# Patient Record
Sex: MALE | Race: WHITE | Employment: FULL TIME | URBAN - METROPOLITAN AREA
[De-identification: names, ages, dates, MRNs, and addresses within clinical notes are randomized per-mention and may not be internally consistent; named-entity substitution may affect disease eponyms.]

---

## 2017-11-20 ENCOUNTER — HOSPITAL ENCOUNTER (OUTPATIENT)
Dept: CT IMAGING | Age: 52
Discharge: HOME | End: 2017-11-20
Payer: COMMERCIAL

## 2017-11-20 DIAGNOSIS — N20.0 RENAL STONE: ICD-10-CM

## 2017-11-20 PROCEDURE — 74176 CT ABD & PELVIS W/O CONTRAST: CPT

## 2018-09-17 ENCOUNTER — HOSPITAL ENCOUNTER (EMERGENCY)
Age: 53
Discharge: HOME | End: 2018-09-17
Attending: STUDENT IN AN ORGANIZED HEALTH CARE EDUCATION/TRAINING PROGRAM
Payer: COMMERCIAL

## 2018-09-17 ENCOUNTER — APPOINTMENT (OUTPATIENT)
Dept: CT IMAGING | Age: 53
End: 2018-09-17
Payer: COMMERCIAL

## 2018-09-17 VITALS
RESPIRATION RATE: 18 BRPM | BODY MASS INDEX: 21.4 KG/M2 | DIASTOLIC BLOOD PRESSURE: 72 MMHG | HEIGHT: 72 IN | TEMPERATURE: 97.5 F | OXYGEN SATURATION: 97 % | WEIGHT: 158 LBS | HEART RATE: 77 BPM | SYSTOLIC BLOOD PRESSURE: 114 MMHG

## 2018-09-17 DIAGNOSIS — R10.12 LEFT UPPER QUADRANT PAIN: Primary | ICD-10-CM

## 2018-09-17 LAB
ALBUMIN SERPL-MCNC: 4.3 G/DL (ref 3.9–4.9)
ALP BLD-CCNC: 65 U/L (ref 35–104)
ALT SERPL-CCNC: 19 U/L (ref 0–41)
AMPHETAMINE SCREEN, URINE: ABNORMAL
ANION GAP SERPL CALCULATED.3IONS-SCNC: 10 MEQ/L (ref 7–13)
AST SERPL-CCNC: 22 U/L (ref 0–40)
BACTERIA: NORMAL /HPF
BARBITURATE SCREEN URINE: ABNORMAL
BASOPHILS ABSOLUTE: 0.1 K/UL (ref 0–0.2)
BASOPHILS RELATIVE PERCENT: 0.8 %
BENZODIAZEPINE SCREEN, URINE: ABNORMAL
BILIRUB SERPL-MCNC: <0.2 MG/DL (ref 0–1.2)
BILIRUBIN URINE: NEGATIVE
BLOOD, URINE: ABNORMAL
BUN BLDV-MCNC: 13 MG/DL (ref 6–20)
CALCIUM SERPL-MCNC: 9 MG/DL (ref 8.6–10.2)
CANNABINOID SCREEN URINE: POSITIVE
CHLORIDE BLD-SCNC: 108 MEQ/L (ref 98–107)
CLARITY: CLEAR
CO2: 23 MEQ/L (ref 22–29)
COCAINE METABOLITE SCREEN URINE: ABNORMAL
COLOR: YELLOW
CREAT SERPL-MCNC: 1.19 MG/DL (ref 0.7–1.2)
EOSINOPHILS ABSOLUTE: 0.1 K/UL (ref 0–0.7)
EOSINOPHILS RELATIVE PERCENT: 1.6 %
ETHANOL PERCENT: NORMAL G/DL
ETHANOL: <10 MG/DL (ref 0–0.08)
GFR AFRICAN AMERICAN: >60
GFR NON-AFRICAN AMERICAN: >60
GLOBULIN: 2.6 G/DL (ref 2.3–3.5)
GLUCOSE BLD-MCNC: 85 MG/DL (ref 74–109)
GLUCOSE URINE: NEGATIVE MG/DL
HCT VFR BLD CALC: 43.6 % (ref 42–52)
HEMOGLOBIN: 14.9 G/DL (ref 14–18)
KETONES, URINE: NEGATIVE MG/DL
LEUKOCYTE ESTERASE, URINE: NEGATIVE
LIPASE: 35 U/L (ref 13–60)
LYMPHOCYTES ABSOLUTE: 1.7 K/UL (ref 1–4.8)
LYMPHOCYTES RELATIVE PERCENT: 20.4 %
Lab: ABNORMAL
MCH RBC QN AUTO: 33.3 PG (ref 27–31.3)
MCHC RBC AUTO-ENTMCNC: 34.1 % (ref 33–37)
MCV RBC AUTO: 97.5 FL (ref 80–100)
MONOCYTES ABSOLUTE: 0.5 K/UL (ref 0.2–0.8)
MONOCYTES RELATIVE PERCENT: 5.6 %
NEUTROPHILS ABSOLUTE: 5.9 K/UL (ref 1.4–6.5)
NEUTROPHILS RELATIVE PERCENT: 71.6 %
NITRITE, URINE: NEGATIVE
OPIATE SCREEN URINE: ABNORMAL
PDW BLD-RTO: 13.5 % (ref 11.5–14.5)
PH UA: 6.5 (ref 5–9)
PHENCYCLIDINE SCREEN URINE: ABNORMAL
PLATELET # BLD: 130 K/UL (ref 130–400)
POTASSIUM SERPL-SCNC: 4.8 MEQ/L (ref 3.5–5.1)
PROTEIN UA: NEGATIVE MG/DL
RBC # BLD: 4.48 M/UL (ref 4.7–6.1)
RBC UA: NORMAL /HPF (ref 0–2)
SODIUM BLD-SCNC: 141 MEQ/L (ref 132–144)
SPECIFIC GRAVITY UA: 1.01 (ref 1–1.03)
TOTAL PROTEIN: 6.9 G/DL (ref 6.4–8.1)
URINE REFLEX TO CULTURE: YES
UROBILINOGEN, URINE: 0.2 E.U./DL
WBC # BLD: 8.2 K/UL (ref 4.8–10.8)
WBC UA: NORMAL /HPF (ref 0–5)

## 2018-09-17 PROCEDURE — 80053 COMPREHEN METABOLIC PANEL: CPT

## 2018-09-17 PROCEDURE — G0480 DRUG TEST DEF 1-7 CLASSES: HCPCS

## 2018-09-17 PROCEDURE — 99284 EMERGENCY DEPT VISIT MOD MDM: CPT

## 2018-09-17 PROCEDURE — 2580000003 HC RX 258: Performed by: PHYSICIAN ASSISTANT

## 2018-09-17 PROCEDURE — 81001 URINALYSIS AUTO W/SCOPE: CPT

## 2018-09-17 PROCEDURE — 87086 URINE CULTURE/COLONY COUNT: CPT

## 2018-09-17 PROCEDURE — 74176 CT ABD & PELVIS W/O CONTRAST: CPT

## 2018-09-17 PROCEDURE — 83690 ASSAY OF LIPASE: CPT

## 2018-09-17 PROCEDURE — 36415 COLL VENOUS BLD VENIPUNCTURE: CPT

## 2018-09-17 PROCEDURE — 96374 THER/PROPH/DIAG INJ IV PUSH: CPT

## 2018-09-17 PROCEDURE — 96375 TX/PRO/DX INJ NEW DRUG ADDON: CPT

## 2018-09-17 PROCEDURE — 85025 COMPLETE CBC W/AUTO DIFF WBC: CPT

## 2018-09-17 PROCEDURE — 80307 DRUG TEST PRSMV CHEM ANLYZR: CPT

## 2018-09-17 PROCEDURE — 6360000002 HC RX W HCPCS: Performed by: PHYSICIAN ASSISTANT

## 2018-09-17 RX ORDER — 0.9 % SODIUM CHLORIDE 0.9 %
1000 INTRAVENOUS SOLUTION INTRAVENOUS ONCE
Status: COMPLETED | OUTPATIENT
Start: 2018-09-17 | End: 2018-09-17

## 2018-09-17 RX ORDER — KETOROLAC TROMETHAMINE 30 MG/ML
30 INJECTION, SOLUTION INTRAMUSCULAR; INTRAVENOUS ONCE
Status: COMPLETED | OUTPATIENT
Start: 2018-09-17 | End: 2018-09-17

## 2018-09-17 RX ORDER — ONDANSETRON 2 MG/ML
4 INJECTION INTRAMUSCULAR; INTRAVENOUS ONCE
Status: COMPLETED | OUTPATIENT
Start: 2018-09-17 | End: 2018-09-17

## 2018-09-17 RX ADMIN — ONDANSETRON HYDROCHLORIDE 4 MG: 2 INJECTION, SOLUTION INTRAMUSCULAR; INTRAVENOUS at 12:37

## 2018-09-17 RX ADMIN — SODIUM CHLORIDE 1000 ML: 9 INJECTION, SOLUTION INTRAVENOUS at 12:37

## 2018-09-17 RX ADMIN — KETOROLAC TROMETHAMINE 30 MG: 30 INJECTION, SOLUTION INTRAMUSCULAR at 12:37

## 2018-09-17 ASSESSMENT — PAIN DESCRIPTION - FREQUENCY: FREQUENCY: INTERMITTENT

## 2018-09-17 ASSESSMENT — ENCOUNTER SYMPTOMS
SHORTNESS OF BREATH: 0
SORE THROAT: 0
COUGH: 0
CONSTIPATION: 0
COLOR CHANGE: 0
NAUSEA: 1
BACK PAIN: 0
RHINORRHEA: 0
VOMITING: 0
CHOKING: 0
ABDOMINAL PAIN: 1
ABDOMINAL DISTENTION: 0
EYE DISCHARGE: 0

## 2018-09-17 ASSESSMENT — PAIN DESCRIPTION - ORIENTATION
ORIENTATION: LEFT
ORIENTATION: LEFT;UPPER

## 2018-09-17 ASSESSMENT — PAIN DESCRIPTION - PAIN TYPE
TYPE: ACUTE PAIN
TYPE: ACUTE PAIN

## 2018-09-17 ASSESSMENT — PAIN SCALES - GENERAL
PAINLEVEL_OUTOF10: 1
PAINLEVEL_OUTOF10: 0
PAINLEVEL_OUTOF10: 7

## 2018-09-17 ASSESSMENT — PAIN DESCRIPTION - DESCRIPTORS: DESCRIPTORS: BURNING;THROBBING

## 2018-09-17 ASSESSMENT — PAIN DESCRIPTION - LOCATION
LOCATION: ABDOMEN
LOCATION: ABDOMEN

## 2018-09-17 NOTE — ED PROVIDER NOTES
headaches. Psychiatric/Behavioral: Negative for agitation and confusion. Except as noted above the remainder of the review of systems was reviewed and negative. PAST MEDICAL HISTORY     Past Medical History:   Diagnosis Date    Chronic back pain     Chronic kidney disease     Stroke ([facility] [address] 2013         SURGICAL HISTORY     History reviewed. No pertinent surgical history. CURRENT MEDICATIONS       Previous Medications    ASPIRIN 81 MG TABLET    Take 81 mg by mouth daily    MELOXICAM (MOBIC) 15 MG TABLET    Take 1 tablet by mouth daily    MELOXICAM (MOBIC) 15 MG TABLET    Take 1 tablet by mouth daily       ALLERGIES     Patient has no known allergies. FAMILY HISTORY       Family History   Problem Relation Age of Onset    Stroke Father     Heart Disease Father           SOCIAL HISTORY       Social History     Social History    Marital status: Single     Spouse name: N/A    Number of children: N/A    Years of education: N/A     Social History Main Topics    Smoking status: Current Some Day Smoker     Types: Cigars    Smokeless tobacco: Never Used    Alcohol use 0.0 oz/week    Drug use: No    Sexual activity: Not Asked     Other Topics Concern    None     Social History Narrative    None       SCREENINGS             PHYSICAL EXAM    (up to 7 for level 4, 8 or more for level 5)     ED Triage Vitals [09/17/18 1054]   BP Temp Temp Source Pulse Resp SpO2 Height Weight   114/72 97.5 °F (36.4 °C) Oral 77 18 97 % 6' (1.829 m) 158 lb (71.7 kg)       Physical Exam   Constitutional: He is oriented to person, place, and time. He appears well-developed and well-nourished. HENT:   Head: Normocephalic. Eyes: Pupils are equal, round, and reactive to light. Neck: Normal range of motion. Neck supple. No JVD present. No tracheal deviation present. Cardiovascular: Normal rate. Pulmonary/Chest: Effort normal. No respiratory distress. He has no wheezes. He has no rales.  He exhibits no 6-12 months, then consider CT at 18-24 months                    >8 mm     Consider CT at 3 months, PET/CT, or tissue sampling   Nodules <6 mm do not require routine follow-up, but certain patients at high risk with suspicious nodule morphology, upper lobe location, or both may warrant 12-month follow-up (recommendation 1A). High risk**                   <6 mm     Optional CT at 12 months                  6-8 mm     CT at 6-12 months, then CT at 18-24 months                    >8 mm     Consider CT at 3 months, PET/CT, or tissue sampling   Nodules <6 mm do not require routine follow-up, but certain patients at high risk with suspicious nodule morphology, upper lobe location, or both may warrant 12-month follow-up (recommendation 1A). Multiple                              Low risk**                   <6 mm     No routine follow-up                  6-8 mm     CT at 3-6 months, then consider CT at 18-24 months                   >8 mm     CT at 3-6 months, then  consider CT at 18-24 months   Use most suspicious nodule as guide to management. Follow-up intervals may vary according to size and risk (recommendation 2A). High risk**                   <6 mm     Optional CT at 12 months                  6-8 mm     CT at 3-6 months, then at 18-24 months                  >8 mm     CT at 3-6 months, then at 18-24 months   Use most suspicious nodule as guide to management. Follow-up intervals may vary according to size and risk (recommendation 2A). B: Subsolid Nodules*           Single                        Ground glass                   <6 mm     No routine follow-up                 >=6 mm     CT at 6-12 months to confirm persistence, then CT  every 2 years until 5 years   In certain suspicious nodules <6 mm, consider follow-up at 2 and 4 years. If solid component(s) or growth develops, consider resection. (Recommendations 3A and 4A).                 Part solid

## 2018-09-19 LAB — URINE CULTURE, ROUTINE: NORMAL

## 2023-05-15 ENCOUNTER — INITIAL CONSULT (OUTPATIENT)
Dept: PODIATRY | Age: 58
End: 2023-05-15

## 2023-05-15 VITALS — BODY MASS INDEX: 20.32 KG/M2 | WEIGHT: 150 LBS | TEMPERATURE: 98 F | HEIGHT: 72 IN

## 2023-05-15 DIAGNOSIS — M25.571 CHRONIC PAIN OF RIGHT ANKLE: Primary | ICD-10-CM

## 2023-05-15 DIAGNOSIS — S93.411A SPRAIN OF CALCANEOFIBULAR LIGAMENT OF RIGHT ANKLE, INITIAL ENCOUNTER: ICD-10-CM

## 2023-05-15 DIAGNOSIS — G89.29 CHRONIC PAIN OF RIGHT ANKLE: Primary | ICD-10-CM

## 2023-05-15 DIAGNOSIS — S93.491A SPRAIN OF ANTERIOR TALOFIBULAR LIGAMENT OF RIGHT ANKLE, INITIAL ENCOUNTER: ICD-10-CM

## 2023-05-15 PROCEDURE — 99203 OFFICE O/P NEW LOW 30 MIN: CPT | Performed by: PODIATRIST

## 2023-05-15 RX ORDER — ATORVASTATIN CALCIUM 40 MG/1
40 TABLET, FILM COATED ORAL
COMMUNITY
Start: 2023-03-23

## 2023-05-15 RX ORDER — NAPROXEN 500 MG/1
500 TABLET ORAL 2 TIMES DAILY
COMMUNITY
Start: 2023-04-26

## 2023-05-15 ASSESSMENT — ENCOUNTER SYMPTOMS
VOMITING: 0
SHORTNESS OF BREATH: 0
NAUSEA: 0
BACK PAIN: 0

## 2023-05-15 NOTE — PROGRESS NOTES
There is no tenderness palpation of the base of the fifth metatarsal.  There is tenderness palpation of the anterior talofibular ligament as well as the calcaneofibular ligament at the lateral right ankle. Right ankle anterior drawer is negative, talar tilt is negative (the left is actually worse than the right), external rotation is negative, and distal squeeze exam is negative. Lymphadenopathy:      Comments: Popliteal lymph nodes are soft and nontender. Skin:     Capillary Refill: Capillary refill takes less than 2 seconds. Comments: No open lesions noted bilaterally. Normal skin turgor noted bilaterally. Normal skin texture noted bilaterally. Focal hyperkeratotic lesion noted to the left hallux IPJ and the left first MPJ  Nail plates are well groomed bilaterally. Neurological:      Mental Status: He is alert and oriented to person, place, and time. Deep Tendon Reflexes: Babinski sign absent on the right side. Babinski sign absent on the left side. Reflex Scores:       Patellar reflexes are 2+ on the right side and 2+ on the left side. Achilles reflexes are 2+ on the right side and 2+ on the left side. Comments: No ankle clonus noted bilaterally. Altered light touch sensation noted to the left foot. Psychiatric:         Mood and Affect: Mood normal.         Behavior: Behavior normal.       Assessment:      Diagnosis Orders   1. Chronic pain of right ankle  XR ANKLE RIGHT (MIN 3 VIEWS)      2. Sprain of anterior talofibular ligament of right ankle, initial encounter  XR ANKLE RIGHT (MIN 3 VIEWS)      3. Sprain of calcaneofibular ligament of right ankle, initial encounter  XR ANKLE RIGHT (MIN 3 VIEWS)          Plan:     Chronic right ankle pain/lateral collateral ligament rupture: We discussed the etiology and treatment options in detail. I recommend the patient continue use of the ASO style ankle brace in his tall work boot when standing and walking.   Patient should

## 2023-05-30 ENCOUNTER — OFFICE VISIT (OUTPATIENT)
Dept: PODIATRY | Age: 58
End: 2023-05-30
Payer: COMMERCIAL

## 2023-05-30 ENCOUNTER — TELEPHONE (OUTPATIENT)
Dept: PODIATRY | Age: 58
End: 2023-05-30

## 2023-05-30 VITALS — HEIGHT: 72 IN | BODY MASS INDEX: 20.32 KG/M2 | TEMPERATURE: 97.1 F | WEIGHT: 150 LBS

## 2023-05-30 DIAGNOSIS — S93.411A SPRAIN OF CALCANEOFIBULAR LIGAMENT OF RIGHT ANKLE, INITIAL ENCOUNTER: ICD-10-CM

## 2023-05-30 DIAGNOSIS — G89.29 CHRONIC PAIN OF RIGHT ANKLE: Primary | ICD-10-CM

## 2023-05-30 DIAGNOSIS — M25.571 CHRONIC PAIN OF RIGHT ANKLE: Primary | ICD-10-CM

## 2023-05-30 DIAGNOSIS — M65.871 OTHER SYNOVITIS AND TENOSYNOVITIS, RIGHT ANKLE AND FOOT: ICD-10-CM

## 2023-05-30 DIAGNOSIS — M19.171 POST-TRAUMATIC ARTHRITIS OF RIGHT ANKLE: ICD-10-CM

## 2023-05-30 DIAGNOSIS — S93.491A SPRAIN OF ANTERIOR TALOFIBULAR LIGAMENT OF RIGHT ANKLE, INITIAL ENCOUNTER: ICD-10-CM

## 2023-05-30 PROCEDURE — 3017F COLORECTAL CA SCREEN DOC REV: CPT | Performed by: PODIATRIST

## 2023-05-30 PROCEDURE — 4004F PT TOBACCO SCREEN RCVD TLK: CPT | Performed by: PODIATRIST

## 2023-05-30 PROCEDURE — G8420 CALC BMI NORM PARAMETERS: HCPCS | Performed by: PODIATRIST

## 2023-05-30 PROCEDURE — 99213 OFFICE O/P EST LOW 20 MIN: CPT | Performed by: PODIATRIST

## 2023-05-30 PROCEDURE — G8427 DOCREV CUR MEDS BY ELIG CLIN: HCPCS | Performed by: PODIATRIST

## 2023-05-30 ASSESSMENT — ENCOUNTER SYMPTOMS
NAUSEA: 0
BACK PAIN: 0
VOMITING: 0
SHORTNESS OF BREATH: 0

## 2023-05-30 NOTE — PROGRESS NOTES
[address] [facility]  Dept: [phone]  Loc: [phone]       Osman Mayfield  ([birth year])    5/30/23    Subjective     Osman Mayfield is 62 y.o. male who complains today of:    Chief Complaint   Patient presents with    Foot Pain     Right       Foot Pain   Pertinent negatives include no fever or numbness. HPI: Patient presents for follow-up. Patient denies significant improvement with use of the ASO style ankle brace. Patient continues to have achiness and throbbing at rest, sharp pain with activity. Patient reports he did have the previously ordered radiographs performed. Review of Systems   Constitutional:  Negative for chills and fever. HENT:  Negative for hearing loss. Respiratory:  Negative for shortness of breath. Cardiovascular:  Negative for chest pain. Gastrointestinal:  Negative for nausea and vomiting. Genitourinary:  Negative for difficulty urinating. Musculoskeletal:  Positive for arthralgias and gait problem. Negative for back pain. Skin:  Negative for wound. Neurological:  Negative for numbness. Hematological:  Does not bruise/bleed easily. Psychiatric/Behavioral:  Negative for sleep disturbance. Allergies:  Patient has no known allergies. Current Outpatient Medications on File Prior to Visit   Medication Sig Dispense Refill    atorvastatin (LIPITOR) 40 MG tablet Take 1 tablet by mouth      naproxen (NAPROSYN) 500 MG tablet Take 1 tablet by mouth 2 times daily      aspirin 81 MG tablet Take 1 tablet by mouth daily       No current facility-administered medications on file prior to visit. Past Medical History:   Diagnosis Date    Chronic back pain     Chronic kidney disease     Stroke [facility]) 2013     History reviewed. No pertinent surgical history.   Social History     Socioeconomic History    Marital status: Single

## 2023-06-02 ENCOUNTER — TELEPHONE (OUTPATIENT)
Dept: PODIATRY | Age: 58
End: 2023-06-02

## 2023-06-02 NOTE — TELEPHONE ENCOUNTER
ANKLE INJECTION    NO AUTH REQUIRED    OK to schedule procedure approved as above. Please note sides/levels approved and date range.    (If applicable, sides/levels approved may differ from those ordered)    TO Butch Ye

## 2023-07-11 ENCOUNTER — OFFICE VISIT (OUTPATIENT)
Dept: PODIATRY | Age: 58
End: 2023-07-11
Payer: COMMERCIAL

## 2023-07-11 VITALS — HEIGHT: 72 IN | WEIGHT: 145 LBS | BODY MASS INDEX: 19.64 KG/M2 | TEMPERATURE: 97.1 F

## 2023-07-11 DIAGNOSIS — S93.491D SPRAIN OF ANTERIOR TALOFIBULAR LIGAMENT OF RIGHT ANKLE, SUBSEQUENT ENCOUNTER: ICD-10-CM

## 2023-07-11 DIAGNOSIS — M25.571 CHRONIC PAIN OF RIGHT ANKLE: Primary | ICD-10-CM

## 2023-07-11 DIAGNOSIS — M19.171 POST-TRAUMATIC ARTHRITIS OF RIGHT ANKLE: ICD-10-CM

## 2023-07-11 DIAGNOSIS — M65.871 OTHER SYNOVITIS AND TENOSYNOVITIS, RIGHT ANKLE AND FOOT: ICD-10-CM

## 2023-07-11 DIAGNOSIS — S93.411D SPRAIN OF CALCANEOFIBULAR LIGAMENT OF RIGHT ANKLE, SUBSEQUENT ENCOUNTER: ICD-10-CM

## 2023-07-11 DIAGNOSIS — G89.29 CHRONIC PAIN OF RIGHT ANKLE: Primary | ICD-10-CM

## 2023-07-11 PROCEDURE — G8427 DOCREV CUR MEDS BY ELIG CLIN: HCPCS | Performed by: PODIATRIST

## 2023-07-11 PROCEDURE — 3017F COLORECTAL CA SCREEN DOC REV: CPT | Performed by: PODIATRIST

## 2023-07-11 PROCEDURE — G8420 CALC BMI NORM PARAMETERS: HCPCS | Performed by: PODIATRIST

## 2023-07-11 PROCEDURE — 4004F PT TOBACCO SCREEN RCVD TLK: CPT | Performed by: PODIATRIST

## 2023-07-11 PROCEDURE — 99213 OFFICE O/P EST LOW 20 MIN: CPT | Performed by: PODIATRIST

## 2023-07-11 ASSESSMENT — ENCOUNTER SYMPTOMS
VOMITING: 0
BACK PAIN: 1
NAUSEA: 0
SHORTNESS OF BREATH: 1

## 2023-07-11 NOTE — PROGRESS NOTES
[address]  [address]  Dept: [phone]  Loc: [phone]       Caesar White  ([birth year])    7/11/23    Subjective     Caesar White is 62 y.o. male who complains today of:    Chief Complaint   Patient presents with    Ankle Pain     Right       Ankle Pain   Associated symptoms include numbness. HPI: Patient presents for follow-up for chronic right ankle pain. Patient reports he had significant relief for about a week after the injection was performed at his last visit, he does report residual decrease in symptoms that has been persistent since that time. Patient reports he has been using the lace up/ASO style ankle brace, he still is yet to make an appointment with the  to be fitted for a gauntlet style AFO. Patient states he was unable to make an appointment due to a death in the family. Patient reports he has had decreased swelling as compared to prior to having the injection performed. Review of Systems   Constitutional:  Negative for chills and fever. HENT:  Negative for hearing loss. Respiratory:  Positive for shortness of breath. Cardiovascular:  Negative for chest pain. Gastrointestinal:  Negative for nausea and vomiting. Genitourinary:  Negative for difficulty urinating. Musculoskeletal:  Positive for arthralgias and back pain. Negative for gait problem. Skin:  Negative for wound. Neurological:  Positive for numbness. Hematological:  Does not bruise/bleed easily. Psychiatric/Behavioral:  Negative for sleep disturbance. Allergies:  Patient has no known allergies.     Current Outpatient Medications on File Prior to Visit   Medication Sig Dispense Refill    atorvastatin (LIPITOR) 40 MG tablet Take 1 tablet by mouth      naproxen (NAPROSYN) 500 MG tablet Take 1 tablet by mouth 2 times daily      aspirin 81 MG tablet Take 1 tablet

## 2023-09-11 ENCOUNTER — HOSPITAL ENCOUNTER (EMERGENCY)
Age: 58
Discharge: HOME | End: 2023-09-11
Payer: COMMERCIAL

## 2023-09-11 ENCOUNTER — APPOINTMENT (OUTPATIENT)
Dept: GENERAL RADIOLOGY | Age: 58
End: 2023-09-11
Payer: COMMERCIAL

## 2023-09-11 VITALS
WEIGHT: 148 LBS | HEIGHT: 72 IN | TEMPERATURE: 98.4 F | DIASTOLIC BLOOD PRESSURE: 73 MMHG | OXYGEN SATURATION: 97 % | RESPIRATION RATE: 16 BRPM | HEART RATE: 72 BPM | BODY MASS INDEX: 20.05 KG/M2 | SYSTOLIC BLOOD PRESSURE: 122 MMHG

## 2023-09-11 DIAGNOSIS — S91.311A LACERATION OF RIGHT FOOT, INITIAL ENCOUNTER: Primary | ICD-10-CM

## 2023-09-11 PROCEDURE — 6370000000 HC RX 637 (ALT 250 FOR IP)

## 2023-09-11 PROCEDURE — 12001 RPR S/N/AX/GEN/TRNK 2.5CM/<: CPT

## 2023-09-11 PROCEDURE — 73630 X-RAY EXAM OF FOOT: CPT

## 2023-09-11 PROCEDURE — 99283 EMERGENCY DEPT VISIT LOW MDM: CPT

## 2023-09-11 RX ORDER — CEPHALEXIN 500 MG/1
500 CAPSULE ORAL 4 TIMES DAILY
Qty: 28 CAPSULE | Refills: 0 | Status: SHIPPED | OUTPATIENT
Start: 2023-09-11 | End: 2023-09-18

## 2023-09-11 RX ORDER — CEPHALEXIN 500 MG/1
500 CAPSULE ORAL ONCE
Status: COMPLETED | OUTPATIENT
Start: 2023-09-11 | End: 2023-09-11

## 2023-09-11 RX ORDER — GINSENG 100 MG
CAPSULE ORAL ONCE
Status: COMPLETED | OUTPATIENT
Start: 2023-09-11 | End: 2023-09-11

## 2023-09-11 RX ADMIN — CEPHALEXIN 500 MG: 500 CAPSULE ORAL at 19:18

## 2023-09-11 RX ADMIN — BACITRACIN: 500 OINTMENT TOPICAL at 19:18

## 2023-09-11 ASSESSMENT — ENCOUNTER SYMPTOMS
COUGH: 0
NAUSEA: 0
SORE THROAT: 0
DIARRHEA: 0
VOMITING: 0
BACK PAIN: 0
SHORTNESS OF BREATH: 0
ABDOMINAL PAIN: 0

## 2023-09-11 ASSESSMENT — PAIN - FUNCTIONAL ASSESSMENT: PAIN_FUNCTIONAL_ASSESSMENT: NONE - DENIES PAIN

## 2023-09-11 NOTE — ED NOTES
Attempted to call patient left voicemail with patient to call ED back.          Rocio Leo, RN  09/11/23 1940

## 2023-09-11 NOTE — ED NOTES
Pt not found in room when nurse went in to give discharge papers and prescription. Pt not found in department. Will call pt to let him know we have his discharge instructions, work note, and prescription for antibiotic.       Musa Segovia RN  09/11/23 1930

## 2023-09-11 NOTE — ED PROVIDER NOTES
[address] [facility] ED  eMERGENCYdEPARTMENT eNCOUnter      Pt Name: Catia Solorzano  MRN: [id number]  Birthdate [birth year] evaluation: 9/11/2023  Provider:GODFREY Fernandez - [place]    CHIEF COMPLAINT       Chief Complaint   Patient presents with    Laceration     Laceration to R. Foot. Pt had chain saw kick back and cut foot. Bleeding controlled         HISTORY OF PRESENT ILLNESS  (Location/Symptom, Timing/Onset, Context/Setting, Quality, Duration, Modifying Factors, Severity.)   Catia Solorzano is a 62 y.o. male  hx of stroke, CKD, who presents to the emergency department with laceration. Presents emergency department with complaints of laceration to right foot. On 4:00 today he was using a chain saw when it kicked back and cut through his shoe into the top of his right foot. Pain is controlled. Last tetanus is up-to-date. He denies any complaints of pain. He denies any other injury or trauma. He denies any chest pain, shortness of breath, abdominal pain, nausea, vomiting, diarrhea, fever, chills, headache, or recent illness. HPI    Nursing Notes were reviewed and I agree. REVIEW OF SYSTEMS    (2-9 systems for level 4, 10 or more for level 5)     Review of Systems   Constitutional:  Negative for activity change, chills and fever. HENT:  Negative for ear pain and sore throat. Eyes:  Negative for visual disturbance. Respiratory:  Negative for cough and shortness of breath. Cardiovascular:  Negative for chest pain, palpitations and leg swelling. Gastrointestinal:  Negative for abdominal pain, diarrhea, nausea and vomiting. Genitourinary:  Negative for dysuria. Musculoskeletal:  Negative for back pain. Skin:  Positive for wound (laceration right foot). Negative for rash. Neurological:  Negative for dizziness and weakness. as noted above the remainder of the review of systems was reviewed and negative.        PAST MEDICAL HISTORY     Past Medical History:   Diagnosis Date    Chronic back the right side. Heart sounds: Normal heart sounds. No murmur heard. No friction rub. Pulmonary:      Effort: Pulmonary effort is normal.      Breath sounds: Normal breath sounds. No wheezing or rhonchi. Abdominal:      General: Bowel sounds are normal. There is no distension. Palpations: Abdomen is soft. Tenderness: There is no abdominal tenderness. Musculoskeletal:         General: Normal range of motion. Cervical back: Normal range of motion and neck supple. Feet:    Feet:      Comments: 2.5 cm laceration to top of right foot. Bleeding controlled. Brisk capillary refill and MSP intact. Skin:     General: Skin is warm and dry. Capillary Refill: Capillary refill takes less than 2 seconds. Neurological:      General: No focal deficit present. Mental Status: He is alert and oriented to person, place, and time. Mental status is at baseline. Psychiatric:         Mood and Affect: Mood normal.         Behavior: Behavior normal.         Thought Content: Thought content normal.         Judgment: Judgment normal.           DIAGNOSTIC RESULTS     RADIOLOGY:   Non-plain film images such as CT, Ultrasound and MRI are read by the radiologist. Plain radiographic images are visualized and preliminarilyinterpreted by GODFREY Sandoval CNP with the below findings:        Interpretation per the Radiologist below, if available at the time of this note:    XR FOOT RIGHT (MIN 3 VIEWS)   Final Result   1. Dorsal soft tissue irregularity and mild soft tissue swelling, dorsal   midfoot. No radiopaque foreign body seen. 2.  Bipartite medial sesamoid bone. Chronic osseous changes to the anterior   superior talus. Mild tibiotalar joint osteoarthritis.       RECOMMENDATION:   In the setting of recent trauma, if there is persistent symptoms and physical   exam warrants a repeat radiograph in 10-14 days could be considered as occult   fractures may not be evident on initial imaging

## 2023-09-11 NOTE — DISCHARGE INSTRUCTIONS
Please apply topical antibiotic ointment daily and change bandage daily. Follow-up with primary care in 1 week for suture removal.  Return to emergency department for any new or worsening symptoms.

## 2023-09-11 NOTE — ED TRIAGE NOTES
2.5cm laceration noted to right dorsal foot, wound cleansed with surgical cleanser.  Pt states last tetanus 2018

## 2023-09-19 ENCOUNTER — OFFICE VISIT (OUTPATIENT)
Dept: PODIATRY | Age: 58
End: 2023-09-19
Payer: COMMERCIAL

## 2023-09-19 VITALS — HEIGHT: 71 IN | TEMPERATURE: 97.1 F | BODY MASS INDEX: 20.72 KG/M2 | WEIGHT: 148 LBS

## 2023-09-19 DIAGNOSIS — M19.171 POST-TRAUMATIC ARTHRITIS OF RIGHT ANKLE: ICD-10-CM

## 2023-09-19 DIAGNOSIS — G89.29 CHRONIC PAIN OF RIGHT ANKLE: Primary | ICD-10-CM

## 2023-09-19 DIAGNOSIS — M25.571 CHRONIC PAIN OF RIGHT ANKLE: Primary | ICD-10-CM

## 2023-09-19 DIAGNOSIS — S91.311A LACERATION OF RIGHT FOOT, INITIAL ENCOUNTER: ICD-10-CM

## 2023-09-19 PROCEDURE — 4004F PT TOBACCO SCREEN RCVD TLK: CPT | Performed by: PODIATRIST

## 2023-09-19 PROCEDURE — 3017F COLORECTAL CA SCREEN DOC REV: CPT | Performed by: PODIATRIST

## 2023-09-19 PROCEDURE — G8420 CALC BMI NORM PARAMETERS: HCPCS | Performed by: PODIATRIST

## 2023-09-19 PROCEDURE — 99213 OFFICE O/P EST LOW 20 MIN: CPT | Performed by: PODIATRIST

## 2023-09-19 PROCEDURE — G8427 DOCREV CUR MEDS BY ELIG CLIN: HCPCS | Performed by: PODIATRIST

## 2023-09-19 ASSESSMENT — ENCOUNTER SYMPTOMS
VOMITING: 0
NAUSEA: 0
BACK PAIN: 0
SHORTNESS OF BREATH: 1

## 2023-10-20 ENCOUNTER — OFFICE VISIT (OUTPATIENT)
Dept: FAMILY MEDICINE CLINIC | Age: 58
End: 2023-10-20
Payer: COMMERCIAL

## 2023-10-20 VITALS
OXYGEN SATURATION: 97 % | SYSTOLIC BLOOD PRESSURE: 124 MMHG | BODY MASS INDEX: 20.16 KG/M2 | DIASTOLIC BLOOD PRESSURE: 76 MMHG | WEIGHT: 144 LBS | HEIGHT: 71 IN | HEART RATE: 73 BPM

## 2023-10-20 DIAGNOSIS — S09.93XA INJURY OF TOOTH, INITIAL ENCOUNTER: ICD-10-CM

## 2023-10-20 DIAGNOSIS — K06.8 PAIN IN GUMS: Primary | ICD-10-CM

## 2023-10-20 PROCEDURE — G8484 FLU IMMUNIZE NO ADMIN: HCPCS

## 2023-10-20 PROCEDURE — 4004F PT TOBACCO SCREEN RCVD TLK: CPT

## 2023-10-20 PROCEDURE — 3017F COLORECTAL CA SCREEN DOC REV: CPT

## 2023-10-20 PROCEDURE — 99213 OFFICE O/P EST LOW 20 MIN: CPT

## 2023-10-20 PROCEDURE — G8427 DOCREV CUR MEDS BY ELIG CLIN: HCPCS

## 2023-10-20 PROCEDURE — G8420 CALC BMI NORM PARAMETERS: HCPCS

## 2023-10-20 RX ORDER — CLINDAMYCIN HYDROCHLORIDE 300 MG/1
300 CAPSULE ORAL 3 TIMES DAILY
Qty: 30 CAPSULE | Refills: 0 | Status: SHIPPED | OUTPATIENT
Start: 2023-10-20 | End: 2023-10-30

## 2023-10-20 ASSESSMENT — ENCOUNTER SYMPTOMS
SINUS PRESSURE: 1
COLOR CHANGE: 0

## 2024-11-07 NOTE — PROGRESS NOTES
Patient Name: John Major : 1965        Date: 2024      Type of Appt: Consult    Reason for appt: C: Cervical disc disorder, unspecified, unspecified cervical region. Cervicalgia. Other symptoms and signs involving the musculoskeletal system     Referred by: HANY CALHOUN     Studies done: NO NEW STUDIES     Physical therapy: YES OR NO    Smoking: Yes or No    REVIEW OF SYSTEMS:    Rash   Difficulty Urinating Nausea     Fever   Headaches  Bruising/Bleeding Easily     Hearing loss  Constipation  Sleep Disturbance    Shortness of breath Diarrhea  Neck Pain  Back Pain    Adequate: hears normal conversation without difficulty

## 2024-11-11 ENCOUNTER — INITIAL CONSULT (OUTPATIENT)
Age: 59
End: 2024-11-11
Payer: COMMERCIAL

## 2024-11-11 VITALS
RESPIRATION RATE: 16 BRPM | TEMPERATURE: 97.8 F | HEIGHT: 71 IN | SYSTOLIC BLOOD PRESSURE: 126 MMHG | BODY MASS INDEX: 20.16 KG/M2 | WEIGHT: 144 LBS | DIASTOLIC BLOOD PRESSURE: 82 MMHG

## 2024-11-11 DIAGNOSIS — M48.02 CERVICAL STENOSIS OF SPINE: Primary | ICD-10-CM

## 2024-11-11 DIAGNOSIS — M48.062 SPINAL STENOSIS OF LUMBAR REGION WITH NEUROGENIC CLAUDICATION: ICD-10-CM

## 2024-11-11 PROCEDURE — G8427 DOCREV CUR MEDS BY ELIG CLIN: HCPCS | Performed by: NEUROLOGICAL SURGERY

## 2024-11-11 PROCEDURE — G8420 CALC BMI NORM PARAMETERS: HCPCS | Performed by: NEUROLOGICAL SURGERY

## 2024-11-11 PROCEDURE — 99204 OFFICE O/P NEW MOD 45 MIN: CPT | Performed by: NEUROLOGICAL SURGERY

## 2024-11-11 PROCEDURE — 99244 OFF/OP CNSLTJ NEW/EST MOD 40: CPT | Performed by: NEUROLOGICAL SURGERY

## 2024-11-11 PROCEDURE — G8484 FLU IMMUNIZE NO ADMIN: HCPCS | Performed by: NEUROLOGICAL SURGERY

## 2024-11-11 ASSESSMENT — ENCOUNTER SYMPTOMS
COUGH: 0
BACK PAIN: 0
ABDOMINAL PAIN: 0
ABDOMINAL DISTENTION: 0
SHORTNESS OF BREATH: 0
EYE PAIN: 0
TROUBLE SWALLOWING: 0

## 2024-11-11 NOTE — PROGRESS NOTES
NEUROSURGERY CONSULT NOTE      Patient Name: John Major  Patient : 1965  MRN: [id number]       PCP: Katherine Judd APRN - CNP        History of Present Ilness: 59 y.o. presents in neurosurgical consultation from [facility] for evaluation of neck pain. This has been for 10 years and worsening. Started having numbness left arm and leg which is worst in left foot. No weakness. Occasional HA's about once a week. Had recent MRI brain at [facility].  He has not had any recent imaging of the spine.      Chief Complaint   Patient presents with    Consultation    Neck Pain          Conservative Treatments:  Physical Therapy: No  NSAID's: No  Narcotics: No  Muscle relaxants: No  Epidural injections: Yes-neck      Past Medical History:        Diagnosis Date    Chronic back pain     Chronic kidney disease     Stroke ([facility])        Past Surgical History:    History reviewed. No pertinent surgical history.    Home Medications:   Prior to Admission medications    Medication Sig Start Date End Date Taking? Authorizing Provider   atorvastatin (LIPITOR) 40 MG tablet Take 1 tablet by mouth 3/23/23  Yes Rodney Huerta MD   naproxen (NAPROSYN) 500 MG tablet Take 1 tablet by mouth 2 times daily 23  Yes Rodney Huerta MD   aspirin 81 MG tablet Take 1 tablet by mouth daily   Yes Rodney Huerta MD           Allergies:   No Known Allergies    Social History:      TOBACCO:   reports that he has been smoking cigars. He has never used smokeless tobacco.  ETOH:   reports current alcohol use.  RECREATIONAL DRUG USE:   Social History     Substance and Sexual Activity   Drug Use Yes    Types: Marijuana (Weed)       Family History:           Problem Relation Age of Onset    Stroke Father     Heart Disease Father              Review of Systems   Constitutional:  Negative for activity change and unexpected weight change.   HENT:  Negative for hearing loss and trouble swallowing.    Eyes:  Negative for

## 2024-11-13 ENCOUNTER — HOSPITAL ENCOUNTER (OUTPATIENT)
Dept: PHYSICAL THERAPY | Age: 59
Setting detail: THERAPIES SERIES
Discharge: HOME | End: 2024-11-13
Attending: NEUROLOGICAL SURGERY
Payer: COMMERCIAL

## 2024-11-13 PROCEDURE — 97162 PT EVAL MOD COMPLEX 30 MIN: CPT

## 2024-11-13 NOTE — PLAN OF CARE
Physical Therapy Evaluation/Plan of Care   [facility] [place] [facility] - PT  [address]  [place] [address]  Dept: [phone]  Dept Fax: [phone]  Loc: [phone]    Physical Therapy: Initial Evaluation    General Information    Patient: John Major (59 y.o.     male)   Examination Date: 2024   :  1965 ;    Confirmed: Yes MRN: [id number]  CSN: [id number]   Insurance: Payor: [facility] / Plan: [facility] MEDICAID / Product Type: *No Product type* /   Insurance ID: [id number] - (Medicaid Managed)  PT Insurance Information: [facility] Secondary Insurance (if applicable):     Referring Physician: Ed Butler MD       Visits to Date/Visits Approved:     No Show/Cancelled Appts: 0 / 0     Medical Diagnosis: Cervical stenosis of spine [M48.02]  Spinal stenosis of lumbar region with neurogenic claudication [M48.062]        Treatment Diagnosis: decreased neck ROM with radiating pain to L upper extremity, decreased lumbar ROM with radiating pain to L lower extremity, decreased muscle and nerve flexibility in LLE, lower back muscle tightness      SUBJECTIVE:     Onset date: L arm intermittent numbness x 1 year, L foot intermittent numbness x 2 years       Subjective/ Mechanism of Injury: Pt presents with concerns for left arm numbness and left foot numbness. Pt reports symptoms have been intermittent over the last 1-2 years. Pt states his foot numbness is more frequent and present about 75% of the time. He states his foot feels cold and his toes curls down. Pt reports his L arm goes numb when he is inactive and movement tends to reduce it. Pt says his L arm does feel weak at times. Pt reports he has a bone spur at C4 which causes nerve compression. Pt states he had a small stroke in  with deficits in the L side of his body. Pt works a [employer] and states he is very active. Pt reports he was previously a  but had to stop d/t the

## 2024-11-18 ENCOUNTER — HOSPITAL ENCOUNTER (OUTPATIENT)
Dept: PHYSICAL THERAPY | Age: 59
Setting detail: THERAPIES SERIES
Discharge: HOME | End: 2024-11-18
Attending: NEUROLOGICAL SURGERY
Payer: COMMERCIAL

## 2024-11-18 NOTE — PROGRESS NOTES
Therapy                            Cancellation/No-show Note      Date: 2024  Patient: John Major (59 y.o. male)  : 1965  MRN:  [id number]  Referring Physician: Ed Butler MD    Medical Diagnosis: Cervical stenosis of spine [M48.02]  Spinal stenosis of lumbar region with neurogenic claudication [M48.062]      Visit Information:  Visits to Date 1   No Show/Cancelled Appts: 0       For today's appointment patient:  [x]  Cancelled  []  Rescheduled appointment  []  No-show   []  Called pt to remind of next appointment     Reason given by patient:  [x]  Patient ill  []  Conflicting appointment  []  No transportation    []  Conflict with work  []  No reason given  []  Other:      [x] Pt has future appointments scheduled, no follow up needed  [] Pt requests to be on hold.    Reason:   If > 2 weeks please discuss with therapist.  [] Therapist to call pt for follow up  []  to call to re-schedule      Comments:       Signature: Electronically signed by Ricardo Sunshine PTA on 24 at 9:37 AM EST

## 2024-11-21 ENCOUNTER — HOSPITAL ENCOUNTER (OUTPATIENT)
Dept: PHYSICAL THERAPY | Age: 59
Setting detail: THERAPIES SERIES
Discharge: HOME | End: 2024-11-21
Attending: NEUROLOGICAL SURGERY
Payer: COMMERCIAL

## 2024-11-21 PROCEDURE — 97110 THERAPEUTIC EXERCISES: CPT

## 2024-11-21 ASSESSMENT — PAIN SCALES - GENERAL: PAINLEVEL_OUTOF10: 6

## 2024-11-21 ASSESSMENT — PAIN DESCRIPTION - DESCRIPTORS: DESCRIPTORS: TIGHTNESS;NUMBNESS

## 2024-11-21 ASSESSMENT — PAIN DESCRIPTION - LOCATION: LOCATION: LEG;ARM;SHOULDER

## 2024-11-21 ASSESSMENT — PAIN DESCRIPTION - ORIENTATION: ORIENTATION: LEFT

## 2024-11-21 NOTE — PROGRESS NOTES
In progress          Plan:  Frequency/Duration:  Plan  Plan Frequency: 1-2  Plan weeks: 8-10  Current Treatment Recommendations: ROM, Strengthening, Manual, Neuromuscular re-education, Modalities, Home exercise program, Dry needling, Patient/Caregiver education & training, Functional mobility training, Therapeutic activities  Modalities: Heat/Cold, Mechanical Traction, Ultrasound, E-stim - unattended  Additional Comments: starting 1x/wk per pt's request  Pt to continue current HEP.  See objective section for any therapeutic exercise changes, additions or modifications this date.    Therapy Time:      PT Individual Minutes  Time In: 0839  Time Out: 0920  Minutes: 41  Timed Code Treatment Minutes: 41 Minutes  Procedure Minutes:0  Timed Activity Minutes Units   Ther Ex 38 3   Manual  3 0   Electronically signed by Zuhair Prieto PTA on 11/21/24 at 9:22 AM EST

## 2024-11-25 ENCOUNTER — HOSPITAL ENCOUNTER (OUTPATIENT)
Dept: PHYSICAL THERAPY | Age: 59
Setting detail: THERAPIES SERIES
Discharge: HOME | End: 2024-11-25
Attending: NEUROLOGICAL SURGERY
Payer: COMMERCIAL

## 2024-11-25 PROCEDURE — 97110 THERAPEUTIC EXERCISES: CPT

## 2024-11-25 ASSESSMENT — PAIN SCALES - GENERAL: PAINLEVEL_OUTOF10: 6

## 2024-11-25 ASSESSMENT — PAIN DESCRIPTION - DESCRIPTORS: DESCRIPTORS: TIGHTNESS;NUMBNESS

## 2024-11-25 ASSESSMENT — PAIN DESCRIPTION - LOCATION: LOCATION: LEG;ARM;SHOULDER

## 2024-11-25 ASSESSMENT — PAIN DESCRIPTION - ORIENTATION: ORIENTATION: LEFT

## 2024-11-25 NOTE — PROGRESS NOTES
[place] [id number] [place] [address]  Phone: [phone]      Date: 2024  Patient: John Major  : 1965   Confirmed: Yes  MRN: [id number]  Referring Provider: Ed Butler MD    Medical Diagnosis: Cervical stenosis of spine [M48.02]  Spinal stenosis of lumbar region with neurogenic claudication [M48.062]       Treatment Diagnosis: decreased neck ROM with radiating pain to L upper extremity, decreased lumbar ROM with radiating pain to L lower extremity, decreased muscle and nerve flexibility in LLE, lower back muscle tightness    Visit Information:  Insurance: Payor: [facility] / [facility] / Product Type: *No Product type* /   PT Visit Information  PT Insurance Information: [employer]  Total # of Visits Approved: 30  Total # of Visits to Date: 3  No Show: 0  Canceled Appointment: 1  Progress Note Counter:     Subjective Information:  Subjective: Pt reports L shoulder/arm has felt a little better this date but states Friday was bad. LLE has been bothersome  HEP Compliance:  [x] Good [] Fair [] Poor [] Reports not doing due to:    Pain Screening  Patient Currently in Pain: Yes  Pain Assessment: 0-10  Pain Level: 6  Pain Location: Leg, Arm, Shoulder  Pain Orientation: Left  Pain Descriptors: Tightness, Numbness    Treatment:  Exercises:  Exercises  Exercise 1: cervical stretches (UT) 3 x  20\"  Exercise 4: Supine Cervical Rotations with towel roll x 10-20 - increased cues to avoid painful ROM when looking left.  Exercise 5: Standing Calf/HS stretch 3 x30\" ea  Exercise 6: Side lying IT band stretch 2 x 20\" (notes increased pain in L side of cervical spine, with patient displaying intense spasm during end of 2nd set)       Manual:   Manual Therapy  Soft Tissue Mobilizaton: Continues to have increased tenderness and spasms in cervical paraspinals - unable to tolerance very light palpation.       Modalities:  Cryotherapy (CPT 74891)  Patient

## 2024-12-03 ENCOUNTER — HOSPITAL ENCOUNTER (OUTPATIENT)
Dept: PHYSICAL THERAPY | Age: 59
Setting detail: THERAPIES SERIES
Discharge: HOME | End: 2024-12-03
Attending: NEUROLOGICAL SURGERY
Payer: COMMERCIAL

## 2024-12-03 PROCEDURE — 97110 THERAPEUTIC EXERCISES: CPT

## 2024-12-03 ASSESSMENT — PAIN DESCRIPTION - LOCATION: LOCATION: SHOULDER

## 2024-12-03 ASSESSMENT — PAIN SCALES - GENERAL: PAINLEVEL_OUTOF10: 5

## 2024-12-03 ASSESSMENT — PAIN DESCRIPTION - ORIENTATION: ORIENTATION: LEFT

## 2024-12-03 ASSESSMENT — PAIN DESCRIPTION - DESCRIPTORS: DESCRIPTORS: TENDER

## 2024-12-03 NOTE — PROGRESS NOTES
[id number] [place] [address]  Phone: [phone]      Date: 12/3/2024  Patient: John Major  : 1965   Confirmed: Yes  MRN: [id number]  Referring Provider: Ed Butler MD    Medical Diagnosis: Cervical stenosis of spine [M48.02]  Spinal stenosis of lumbar region with neurogenic claudication [M48.062]       Treatment Diagnosis: decreased neck ROM with radiating pain to L upper extremity, decreased lumbar ROM with radiating pain to L lower extremity, decreased muscle and nerve flexibility in LLE, lower back muscle tightness    Visit Information:  Insurance: Payor: [facility] / [facility] / Product Type: *No Product type* /   PT Visit Information  PT Insurance Information: [employer]  Total # of Visits Approved: 30  Total # of Visits to Date: 4  No Show: 0  Canceled Appointment: 1  Progress Note Counter: 3/16-    Subjective Information:  Subjective: pt reports woke up feelings better today. did manual labor over the weekend causing soreness today. c/o of mod pain in L kneck/shoulder and LLE numbness  HEP Compliance:  [x] Good [] Fair [] Poor [] Reports not doing due to:    Pain Screening  Patient Currently in Pain: Yes  Pain Assessment: 0-10  Pain Level: 5  Pain Location: Shoulder  Pain Orientation: Left  Pain Descriptors: Tender    Treatment:  Exercises:  Exercises  Exercise 1: cervical stretches (UT) 3 x  20\"  Exercise 2: Pball rollouts x 5 x 10\" to R and forward  Exercise 4: Supine Cervical Rotations with towel roll x 10-20 - increased cues to avoid painful ROM when looking left.  Exercise 5: Seated HS stretch 3 x30\" ea  Exercise 7: paloff press YTB  x 10  Exercise 20: HEP: radial nerve glides, HS/Calf stretches, UT stretch     Modalities:  Cryotherapy (CPT 94917)  Patient Position: Seated  Number Minutes Cryotherapy: 10  Cryotherapy location: Left, Shoulder, Cervical  Post treatment skin assessment: Intact  Limitations addressed: Pain

## 2024-12-10 ENCOUNTER — TELEPHONE (OUTPATIENT)
Age: 59
End: 2024-12-10

## 2024-12-10 ENCOUNTER — HOSPITAL ENCOUNTER (OUTPATIENT)
Dept: PHYSICAL THERAPY | Age: 59
Setting detail: THERAPIES SERIES
Discharge: HOME | End: 2024-12-10
Attending: NEUROLOGICAL SURGERY
Payer: COMMERCIAL

## 2024-12-10 NOTE — PROGRESS NOTES
Therapy                            Cancellation/No-show Note      Date: 12/10/2024  Patient: John Major (59 y.o. male)  : 1965  MRN:  [id number]  Referring Physician: Ed Butler MD    Medical Diagnosis: Cervical stenosis of spine [M48.02]  Spinal stenosis of lumbar region with neurogenic claudication [M48.062]      Visit Information:  Visits to Date 4   No Show/Cancelled Appts:       For today's appointment patient:  []  Cancelled  []  Rescheduled appointment  [x]  No-show   [x]  Called pt to remind of next appointment     Reason given by patient:  []  Patient ill  []  Conflicting appointment  []  No transportation    []  Conflict with work  []  No reason given  [x]  Other:  Pt forgot about appointment    [] Pt has future appointments scheduled, no follow up needed  [] Pt requests to be on hold.    Reason:   If > 2 weeks please discuss with therapist.  [] Therapist to call pt for follow up  [x] [facility] to call to re-schedule      Comments:       Signature: Electronically signed by Sangeeta Johnson PT on 12/10/24 at 8:25 AM EST

## 2024-12-10 NOTE — TELEPHONE ENCOUNTER
Left message for the patient regarding studies. MRI of cervical and MRI of lumbar . I see that the patient has not had the study done, if the patient was unable to complete this by his appointment time, please contact the office to get rescheduled.

## 2025-01-06 ENCOUNTER — TELEPHONE (OUTPATIENT)
Age: 60
End: 2025-01-06

## 2025-01-06 NOTE — TELEPHONE ENCOUNTER
Pt wanted to let Dr Butler know that he was able to get this MRI done on 1/2/2025. He has a appt on 1/22/25

## 2025-01-16 ENCOUNTER — APPOINTMENT (OUTPATIENT)
Dept: MRI IMAGING | Age: 60
End: 2025-01-16
Attending: NEUROLOGICAL SURGERY
Payer: COMMERCIAL

## 2025-01-16 ENCOUNTER — HOSPITAL ENCOUNTER (OUTPATIENT)
Dept: MRI IMAGING | Age: 60
Discharge: HOME | End: 2025-01-18
Attending: NEUROLOGICAL SURGERY
Payer: COMMERCIAL

## 2025-01-16 DIAGNOSIS — M50.90 CERVICAL DISC DISORDER, UNSPECIFIED, UNSPECIFIED CERVICAL REGION: ICD-10-CM

## 2025-01-16 DIAGNOSIS — R29.898 OTHER SYMPTOMS AND SIGNS INVOLVING THE MUSCULOSKELETAL SYSTEM: ICD-10-CM

## 2025-01-16 DIAGNOSIS — M48.062 SPINAL STENOSIS OF LUMBAR REGION WITH NEUROGENIC CLAUDICATION: ICD-10-CM

## 2025-01-16 DIAGNOSIS — M54.2 CERVICALGIA: ICD-10-CM

## 2025-01-16 DIAGNOSIS — M48.02 CERVICAL STENOSIS OF SPINE: ICD-10-CM

## 2025-01-16 PROCEDURE — 72148 MRI LUMBAR SPINE W/O DYE: CPT

## 2025-01-16 PROCEDURE — 72141 MRI NECK SPINE W/O DYE: CPT

## 2025-01-28 NOTE — PROGRESS NOTES
Patient Name: John Major : 1965        Date: 2025      Type of Appt: Follow up    Reason for appt: Follow up to with studies obtained     Pt last seen by Dr Butler 2024    Studies done: 24 MRI of Cervical Spine at [facility]  25 MRI of Lumbar Spine at [facility]    Conservative Treatments:  Physical Therapy: Yes  NSAID's: Yes  Narcotics: No  Muscle relaxants: No  Epidural injections: Yes Cervical     [x] Yes   []  No  Any Blood Thinners:      [x] Aspirin   [] Eliquis   [] Xarelto   [] Pletal   [] Plavix    [] Warfarin   [] Coumadin     [] Yes   [x] No   Diabetic:   [] Yes   [x]  No If yes, prescribed insulin:      [] Yes  [x] No  weight loss medications:      [] Ozempic   [] Wegovy    [] Trulicity    [] Mounjaro         [x] Yes   []  No Smoking :

## 2025-02-05 ENCOUNTER — OFFICE VISIT (OUTPATIENT)
Age: 60
End: 2025-02-05
Payer: COMMERCIAL

## 2025-02-05 VITALS
WEIGHT: 140 LBS | RESPIRATION RATE: 16 BRPM | DIASTOLIC BLOOD PRESSURE: 68 MMHG | BODY MASS INDEX: 18.96 KG/M2 | HEIGHT: 72 IN | SYSTOLIC BLOOD PRESSURE: 124 MMHG

## 2025-02-05 DIAGNOSIS — M48.02 CERVICAL STENOSIS OF SPINE: Primary | ICD-10-CM

## 2025-02-05 DIAGNOSIS — M48.062 SPINAL STENOSIS OF LUMBAR REGION WITH NEUROGENIC CLAUDICATION: ICD-10-CM

## 2025-02-05 PROCEDURE — G8420 CALC BMI NORM PARAMETERS: HCPCS | Performed by: NEUROLOGICAL SURGERY

## 2025-02-05 PROCEDURE — G8427 DOCREV CUR MEDS BY ELIG CLIN: HCPCS | Performed by: NEUROLOGICAL SURGERY

## 2025-02-05 PROCEDURE — 3017F COLORECTAL CA SCREEN DOC REV: CPT | Performed by: NEUROLOGICAL SURGERY

## 2025-02-05 PROCEDURE — 99214 OFFICE O/P EST MOD 30 MIN: CPT | Performed by: NEUROLOGICAL SURGERY

## 2025-02-05 PROCEDURE — 4004F PT TOBACCO SCREEN RCVD TLK: CPT | Performed by: NEUROLOGICAL SURGERY

## 2025-02-05 ASSESSMENT — ENCOUNTER SYMPTOMS
DIARRHEA: 0
BACK PAIN: 1
SHORTNESS OF BREATH: 0
NAUSEA: 0
CONSTIPATION: 0

## 2025-02-05 NOTE — PROGRESS NOTES
NEUROSURGERY and SPINE FOLLOW-UP NOTE      Patient Name: John Major  Patient : 1965  MRN: [id number]       PCP: Katherine Judd APRN - CNP      History of Present Ilness: 59 y.o. presents with follow-up visit.  Patient was seen 2 months ago with neck pain, left upper extremity and left lower extremity pain and numbness.  Since this visit his neck pain has slightly improved but his left-sided symptoms have worsened.  He is now complaining of weakness and numbness left arm greater than left leg also pain down the left arm and leg.  Has occasional headaches about once a week.  Patient denies any right upper extremity or right lower extremity symptoms at this time.    He has not followed up with neurology as recommended in previous visit.    Chief Complaint   Patient presents with    Back Pain          Conservative Treatments:  Physical Therapy: No  NSAID's: No  Narcotics: No  Muscle relaxants: No  Epidural injections: Yes-neck    Past Medical History:        Diagnosis Date    Chronic back pain     Chronic kidney disease     Stroke (HCC)        Past Surgical History:    No past surgical history on file.    Home Medications:   Prior to Admission medications    Medication Sig Start Date End Date Taking? Authorizing Provider   atorvastatin (LIPITOR) 40 MG tablet Take 1 tablet by mouth 3/23/23  Yes Rodney Huerta MD   naproxen (NAPROSYN) 500 MG tablet Take 1 tablet by mouth 2 times daily 23  Yes Rodney Huerta MD   aspirin 81 MG tablet Take 1 tablet by mouth daily   Yes Rodney Huerta MD           Allergies: Patient has no known allergies.    Social History:      TOBACCO:   reports that he has been smoking cigars. He has never used smokeless tobacco.  ETOH:   reports current alcohol use.  RECREATIONAL DRUG USE:   Social History     Substance and Sexual Activity   Drug Use Yes    Types: Marijuana (Weed)       Family History:           Problem Relation Age of Onset    Stroke Father

## 2025-02-20 ENCOUNTER — HOSPITAL ENCOUNTER (OUTPATIENT)
Dept: MRI IMAGING | Age: 60
Discharge: HOME | End: 2025-02-22
Payer: COMMERCIAL

## 2025-02-20 DIAGNOSIS — M48.062 SPINAL STENOSIS OF LUMBAR REGION WITH NEUROGENIC CLAUDICATION: ICD-10-CM

## 2025-02-20 DIAGNOSIS — M48.02 CERVICAL STENOSIS OF SPINE: ICD-10-CM

## 2025-02-20 PROCEDURE — 70551 MRI BRAIN STEM W/O DYE: CPT

## 2025-04-18 NOTE — PROGRESS NOTES
Patient Name: John Major : 1965        Date: 2025      Type of Appt: Follow up    Reason for appt: 3 month follow up with studies     Pt last seen by  Dr. Butler on 2025     Studies done: 2025 MRI Brain at [facility] Lumbar Spine TBC     Conservative Treatments (Have you ever tried any)  Physical Therapy in the last 6 months to a year: No   NSAID's: Yes  Narcotics: No  Muscle relaxants: No  Epidural injections: Yes, Cervical     Any Blood Thinners: [x] Yes   []  No        [x] Aspirin   [] Eliquis   [] Xarelto   [] Pletal   [] Plavix    [] Warfarin        Diabetic:  [] Yes   [x] No   If yes, prescribed insulin:  [] Yes   [x]  No     Weight loss medications:   [] Yes  [x] No     [] Ozempic   [] Wegovy    [] Trulicity    [] Mounjaro         Smoking : [x] Yes   []  No Cigars, Some days

## 2025-05-05 ENCOUNTER — OFFICE VISIT (OUTPATIENT)
Age: 60
End: 2025-05-05
Payer: COMMERCIAL

## 2025-05-05 VITALS
RESPIRATION RATE: 16 BRPM | BODY MASS INDEX: 21.14 KG/M2 | SYSTOLIC BLOOD PRESSURE: 128 MMHG | HEIGHT: 71 IN | WEIGHT: 151 LBS | DIASTOLIC BLOOD PRESSURE: 82 MMHG

## 2025-05-05 DIAGNOSIS — R53.1 WEAKNESS: ICD-10-CM

## 2025-05-05 DIAGNOSIS — M48.062 SPINAL STENOSIS OF LUMBAR REGION WITH NEUROGENIC CLAUDICATION: ICD-10-CM

## 2025-05-05 DIAGNOSIS — M48.02 CERVICAL STENOSIS OF SPINE: Primary | ICD-10-CM

## 2025-05-05 PROCEDURE — G8420 CALC BMI NORM PARAMETERS: HCPCS | Performed by: NEUROLOGICAL SURGERY

## 2025-05-05 PROCEDURE — 99214 OFFICE O/P EST MOD 30 MIN: CPT | Performed by: NEUROLOGICAL SURGERY

## 2025-05-05 PROCEDURE — 99213 OFFICE O/P EST LOW 20 MIN: CPT | Performed by: NEUROLOGICAL SURGERY

## 2025-05-05 PROCEDURE — 3017F COLORECTAL CA SCREEN DOC REV: CPT | Performed by: NEUROLOGICAL SURGERY

## 2025-05-05 PROCEDURE — G8427 DOCREV CUR MEDS BY ELIG CLIN: HCPCS | Performed by: NEUROLOGICAL SURGERY

## 2025-05-05 PROCEDURE — 4004F PT TOBACCO SCREEN RCVD TLK: CPT | Performed by: NEUROLOGICAL SURGERY

## 2025-05-05 RX ORDER — AMOXICILLIN 500 MG/1
CAPSULE ORAL
COMMUNITY
Start: 2025-04-29

## 2025-05-05 ASSESSMENT — ENCOUNTER SYMPTOMS
DIARRHEA: 0
BACK PAIN: 1
CONSTIPATION: 0
NAUSEA: 0
SHORTNESS OF BREATH: 0

## 2025-05-05 NOTE — PATIENT INSTRUCTIONS
We're looking forward to seeing you at your upcoming appointment     Now you can save time and skip the clipboard! Pre-Registration lets you complete your appointment paperwork and pay your copay directly from your MyChart. Then, when you arrive for your appointment, simply stop at central check in, located on the first floor, and then proceed to the suite. We are located on the first floor in [address], right next to central check in.     We are committed to providing you with exceptional care and look forward to seeing you at your upcoming appointment. If you have any questions or concerns, please do not hesitate to reach out to us.       [facility]

## 2025-05-05 NOTE — PROGRESS NOTES
NEUROSURGERY and SPINE FOLLOW-UP NOTE      Patient Name: John Major  Patient : 1965  MRN: [id number]       PCP: Torie Nicolas MD      History of Present Ilness: 59 y.o. presents with follow-up visit.  Patient was seen 3 months ago on 2025 with neck pain, LUE pain/numbness/weakness.  Was also having similar symptoms, less severe in his LLE.     Today, symptoms are similar.  His chief complaint is LUE pain/numbness/weakness.  Continuing to have pain down the left leg.  Also states today he has pain down entire RLE to foot.  Numbness in feet R>L.  Additionally having sharp left-sided headaches about once a week.    Chief Complaint   Patient presents with    Follow-up     Patient presents today for  a Follow up  from 25 MRI of Brain     Symptoms: Patient is having cervical pain that travels down into back and into legs. Patient stated right leg is more numb then left    Pain level  6/10            Conservative Treatments:  Physical Therapy: No  NSAID's: No  Narcotics: No  Muscle relaxants: No  Epidural injections: Yes-neck    Past Medical History:        Diagnosis Date    Chronic back pain     Chronic kidney disease     Stroke (HCC)        Past Surgical History:    No past surgical history on file.    Home Medications:   Prior to Admission medications    Medication Sig Start Date End Date Taking? Authorizing Provider   amoxicillin (AMOXIL) 500 MG capsule TAKE 1 CAPSULE BY MOUTH EVERY 6 HOURS 25  Yes Rodney Huerta MD   atorvastatin (LIPITOR) 40 MG tablet Take 1 tablet by mouth 3/23/23  Yes Rodney Huerta MD   naproxen (NAPROSYN) 500 MG tablet Take 1 tablet by mouth 2 times daily 23  Yes Rodney Huerta MD   aspirin 81 MG tablet Take 1 tablet by mouth daily   Yes Rodney Huerta MD           Allergies: Patient has no known allergies.    Social History:      TOBACCO:   reports that he has been smoking cigars. He has never used smokeless tobacco.  ETOH:

## 2025-06-10 ENCOUNTER — TELEPHONE (OUTPATIENT)
Age: 60
End: 2025-06-10

## 2025-06-10 ENCOUNTER — HOSPITAL ENCOUNTER (OUTPATIENT)
Dept: MRI IMAGING | Age: 60
Discharge: HOME | End: 2025-06-12
Attending: NEUROLOGICAL SURGERY
Payer: COMMERCIAL

## 2025-06-10 DIAGNOSIS — M48.02 CERVICAL STENOSIS OF SPINE: ICD-10-CM

## 2025-06-10 DIAGNOSIS — M48.062 SPINAL STENOSIS OF LUMBAR REGION WITH NEUROGENIC CLAUDICATION: ICD-10-CM

## 2025-06-10 DIAGNOSIS — M48.062 SPINAL STENOSIS OF LUMBAR REGION WITH NEUROGENIC CLAUDICATION: Primary | ICD-10-CM

## 2025-06-10 DIAGNOSIS — R53.1 WEAKNESS: ICD-10-CM

## 2025-06-10 PROCEDURE — 6360000004 HC RX CONTRAST MEDICATION: Performed by: NEUROLOGICAL SURGERY

## 2025-06-10 PROCEDURE — 70553 MRI BRAIN STEM W/O & W/DYE: CPT

## 2025-06-10 PROCEDURE — A9577 INJ MULTIHANCE: HCPCS | Performed by: NEUROLOGICAL SURGERY

## 2025-06-10 RX ADMIN — GADOBENATE DIMEGLUMINE 15 ML: 529 INJECTION, SOLUTION INTRAVENOUS at 15:25

## 2025-06-10 NOTE — TELEPHONE ENCOUNTER
Radiology called the pt's order for the XR of lumbar spine has  and they were wondering if a new one can be put in.

## 2025-06-13 ENCOUNTER — TELEPHONE (OUTPATIENT)
Age: 60
End: 2025-06-13

## 2025-06-13 NOTE — PROGRESS NOTES
Patient Name: John Major : 1965        Date: 2025      Type of Appt: Follow up    Reason for appt: Follow up with studies     Pt last seen by Dr Butler 2025    Studies done: 6/10/25 MRI of Brain at [facility]  25 Xray of Lumbar Spine at [facility]    Conservative Treatments (Have you ever tried any)  Physical Therapy in the last 6 months to a year: No  NSAID's: Yes   Narcotics: No  Muscle relaxants: No  Epidural injections: Yes Cervical     Any Blood Thinners: [x] Yes   []  No        [x] Aspirin   [] Eliquis   [] Xarelto   [] Pletal   [] Plavix    [] Warfarin        Diabetic:  [] Yes   [x] No   If yes, prescribed insulin:  [] Yes   [x]  No     Weight loss medications:   [] Yes  [x] No     [] Ozempic   [] Wegovy    [] Trulicity    [] Mounjaro         Smoking : [x] Yes   []  No

## 2025-06-14 ENCOUNTER — HOSPITAL ENCOUNTER (OUTPATIENT)
Dept: GENERAL RADIOLOGY | Age: 60
Discharge: HOME | End: 2025-06-16
Attending: NEUROLOGICAL SURGERY
Payer: COMMERCIAL

## 2025-06-14 DIAGNOSIS — M48.062 SPINAL STENOSIS OF LUMBAR REGION WITH NEUROGENIC CLAUDICATION: ICD-10-CM

## 2025-06-14 PROCEDURE — 72110 X-RAY EXAM L-2 SPINE 4/>VWS: CPT

## 2025-06-23 ENCOUNTER — OFFICE VISIT (OUTPATIENT)
Age: 60
End: 2025-06-23
Payer: COMMERCIAL

## 2025-06-23 VITALS
BODY MASS INDEX: 20.72 KG/M2 | TEMPERATURE: 97.4 F | WEIGHT: 148 LBS | OXYGEN SATURATION: 96 % | HEART RATE: 66 BPM | SYSTOLIC BLOOD PRESSURE: 100 MMHG | HEIGHT: 71 IN | DIASTOLIC BLOOD PRESSURE: 60 MMHG

## 2025-06-23 DIAGNOSIS — M48.062 SPINAL STENOSIS OF LUMBAR REGION WITH NEUROGENIC CLAUDICATION: ICD-10-CM

## 2025-06-23 DIAGNOSIS — M48.02 CERVICAL STENOSIS OF SPINE: Primary | ICD-10-CM

## 2025-06-23 PROCEDURE — 99213 OFFICE O/P EST LOW 20 MIN: CPT | Performed by: NEUROLOGICAL SURGERY

## 2025-06-23 PROCEDURE — 3017F COLORECTAL CA SCREEN DOC REV: CPT | Performed by: NEUROLOGICAL SURGERY

## 2025-06-23 PROCEDURE — 4004F PT TOBACCO SCREEN RCVD TLK: CPT | Performed by: NEUROLOGICAL SURGERY

## 2025-06-23 PROCEDURE — 99214 OFFICE O/P EST MOD 30 MIN: CPT | Performed by: NEUROLOGICAL SURGERY

## 2025-06-23 PROCEDURE — G8427 DOCREV CUR MEDS BY ELIG CLIN: HCPCS | Performed by: NEUROLOGICAL SURGERY

## 2025-06-23 PROCEDURE — G8420 CALC BMI NORM PARAMETERS: HCPCS | Performed by: NEUROLOGICAL SURGERY

## 2025-06-23 RX ORDER — NYSTATIN 100000 [USP'U]/ML
SUSPENSION ORAL
COMMUNITY
Start: 2025-06-08

## 2025-06-23 RX ORDER — IBUPROFEN 600 MG/1
TABLET, FILM COATED ORAL
COMMUNITY
Start: 2025-04-29

## 2025-06-23 RX ORDER — ONDANSETRON 8 MG/1
8 TABLET, ORALLY DISINTEGRATING ORAL
COMMUNITY
Start: 2025-01-28

## 2025-06-23 ASSESSMENT — ENCOUNTER SYMPTOMS
BACK PAIN: 1
COUGH: 0
TROUBLE SWALLOWING: 0
ABDOMINAL DISTENTION: 0
EYE PAIN: 0
ABDOMINAL PAIN: 0
SHORTNESS OF BREATH: 0

## 2025-06-23 NOTE — PROGRESS NOTES
NEUROSURGERY and SPINE FOLLOW-UP NOTE      Patient Name: John Major  Patient : 1965  MRN: [id number]       PCP: Torie Nicolas MD      History of Present Ilness: 59 y.o. presents with f/u. Having HA's 1-2 times a week. Also continues with LUE and LLE weakness and numbness. Some numbness right foot and left side. Intermittent neck pain, rare LBP.  Rare radiating pain down LUE and leg. Worst c/o is HA's.  He tells me the neurologic complaints have been going on for several years.  He has also noted some weakness of the left face.    Chief Complaint   Patient presents with    Follow-up          Conservative Treatments:  Physical Therapy: Yes  NSAID's: No  Narcotics: No  Muscle relaxants: No  Epidural injections: Yes-cervical      Past Medical History:        Diagnosis Date    Chronic back pain     Chronic kidney disease     Stroke (HCC)        Past Surgical History:    No past surgical history on file.    Home Medications:   Prior to Admission medications    Medication Sig Start Date End Date Taking? Authorizing Provider   ibuprofen (ADVIL;MOTRIN) 600 MG tablet TAKE 1 TABLET BY MOUTH THREE TIMES DAILY WITH FOOD 25  Yes Rodney Huerta MD   nystatin (MYCOSTATIN) 057268 UNIT/ML suspension take 4 ml BY MOUTH EVERY 6 HOURS FOR 14 DAYS 25  Yes Rodney Huerta MD   ondansetron (ZOFRAN-ODT) 8 MG TBDP disintegrating tablet Take 1 tablet by mouth 25  Yes Rodney Huerta MD   atorvastatin (LIPITOR) 40 MG tablet Take 1 tablet by mouth 3/23/23  Yes Rodney Huerta MD   naproxen (NAPROSYN) 500 MG tablet Take 1 tablet by mouth 2 times daily 23  Yes Rodney Huerta MD   aspirin 81 MG tablet Take 1 tablet by mouth daily   Yes Rodney Huerta MD   amoxicillin (AMOXIL) 500 MG capsule TAKE 1 CAPSULE BY MOUTH EVERY 6 HOURS 25   Rodney Huerta MD           Allergies: Patient has no known allergies.    Social History:      TOBACCO:   reports that he